# Patient Record
Sex: MALE | Race: ASIAN | NOT HISPANIC OR LATINO | URBAN - METROPOLITAN AREA
[De-identification: names, ages, dates, MRNs, and addresses within clinical notes are randomized per-mention and may not be internally consistent; named-entity substitution may affect disease eponyms.]

---

## 2018-07-01 ENCOUNTER — EMERGENCY (EMERGENCY)
Facility: HOSPITAL | Age: 23
LOS: 0 days | Discharge: HOME | End: 2018-07-02
Attending: EMERGENCY MEDICINE | Admitting: EMERGENCY MEDICINE
Payer: SELF-PAY

## 2018-07-01 VITALS
DIASTOLIC BLOOD PRESSURE: 78 MMHG | SYSTOLIC BLOOD PRESSURE: 114 MMHG | OXYGEN SATURATION: 100 % | TEMPERATURE: 99 F | HEIGHT: 71 IN | WEIGHT: 259.93 LBS | HEART RATE: 73 BPM

## 2018-07-01 DIAGNOSIS — S93.402A SPRAIN OF UNSPECIFIED LIGAMENT OF LEFT ANKLE, INITIAL ENCOUNTER: ICD-10-CM
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
## 2018-07-01 DIAGNOSIS — M25.572 PAIN IN LEFT ANKLE AND JOINTS OF LEFT FOOT: ICD-10-CM

## 2018-07-01 PROCEDURE — 99284 EMERGENCY DEPT VISIT MOD MDM: CPT | Mod: 25

## 2018-07-01 PROCEDURE — 99053 MED SERV 10PM-8AM 24 HR FAC: CPT

## 2018-07-01 RX ORDER — IBUPROFEN 200 MG
600 TABLET ORAL ONCE
Qty: 0 | Refills: 0 | Status: COMPLETED | OUTPATIENT
Start: 2018-07-01 | End: 2018-07-01

## 2018-07-01 NOTE — ED ADULT NURSE NOTE - NS ED NURSE DC INFO COMPLEXITY
Patient asked questions/Moderate: Comprehensive teaching/Verbalized Understanding/Returned Demonstration

## 2018-07-01 NOTE — ED ADULT NURSE NOTE - OBJECTIVE STATEMENT
22 year old male with no past medical history on no medications (daily cannabis smoker) presenting to the ED complaining of left ankle pain. Patient states that he felt his left ankle twist while he was walking last night, twisting laterally as her fell down. Patient states that he had increased pain/tenderness while ambulating on it tonight. Patient additionally complaining of a 4/10 aching to his left lateral malleolus. No obvious trauma/deformities noted.

## 2018-07-01 NOTE — ED ADULT NURSE NOTE - CHPI ED SYMPTOMS NEG
no fever/no tingling/no weakness/no numbness/no bruising/no abrasion/no stiffness/no deformity/no back pain

## 2018-07-01 NOTE — ED ADULT TRIAGE NOTE - BSA (M2)
[address] [place] [address]  Authorization for Invasive Procedures  1.  I hereby authorize Dr. Venu Toro** , my physician and whomever may be designated as the doctor's assistant, to perform the following operation and/or procedure:  *Tu performed for the purposes of advancing medicine, science, and/or education, provided my identity is not revealed. If the procedure has been videotaped, the physician/surgeon will obtain the original videotape.  The hospital will not be responsible for stor My signature below affirms that prior to the time of the procedure, I have explained to the patient and/or her legal representative, the risks and benefits involved in the proposed treatment and any reasonable alternative to the proposed treatment.  I have 2.36

## 2018-07-01 NOTE — ED ADULT TRIAGE NOTE - CHIEF COMPLAINT QUOTE
Pt presents to the ED with complaints of left ankle injury and pain s/p trip last night at approx 1AM. Pt able to wiggle toes, swelling noted to the ankle.

## 2018-07-02 VITALS
DIASTOLIC BLOOD PRESSURE: 80 MMHG | OXYGEN SATURATION: 100 % | SYSTOLIC BLOOD PRESSURE: 121 MMHG | HEART RATE: 70 BPM | RESPIRATION RATE: 17 BRPM

## 2018-07-02 PROCEDURE — 73610 X-RAY EXAM OF ANKLE: CPT | Mod: 26,LT

## 2018-07-02 RX ADMIN — Medication 600 MILLIGRAM(S): at 00:00

## 2018-07-02 NOTE — ED PROVIDER NOTE - OBJECTIVE STATEMENT
21 y/o male in ED c/o left ankle pain x 2 days s/p inversion ionjury.  pt states took motrin yesterday with some relief.   pt denies any other complaints.   increase pt with weight bearing

## 2018-07-02 NOTE — ED ADULT NURSE REASSESSMENT NOTE - NS ED NURSE REASSESS COMMENT FT1
0000: Patient awaiting X-rays, texting on his phone in no apparent distress, states pain well controlled at rest.   0100: Left ankle air cast applied by MD Atkinson. Patient able to ambulate well with cast. Patient education on follow up, signs and symptoms to return to the ER, and his care.

## 2019-09-12 ENCOUNTER — APPOINTMENT (OUTPATIENT)
Dept: PODIATRY | Facility: CLINIC | Age: 24
End: 2019-09-12

## 2019-09-12 ENCOUNTER — OUTPATIENT (OUTPATIENT)
Dept: OUTPATIENT SERVICES | Facility: HOSPITAL | Age: 24
LOS: 1 days | End: 2019-09-12
Payer: MEDICAID

## 2019-09-12 VITALS
WEIGHT: 233 LBS | BODY MASS INDEX: 29.9 KG/M2 | DIASTOLIC BLOOD PRESSURE: 76 MMHG | RESPIRATION RATE: 18 BRPM | OXYGEN SATURATION: 98 % | HEIGHT: 74 IN | HEART RATE: 76 BPM | TEMPERATURE: 97.9 F | SYSTOLIC BLOOD PRESSURE: 122 MMHG

## 2019-09-12 DIAGNOSIS — M20.41 OTHER HAMMER TOE(S) (ACQUIRED), RIGHT FOOT: ICD-10-CM

## 2019-09-12 DIAGNOSIS — F17.210 NICOTINE DEPENDENCE, CIGARETTES, UNCOMPLICATED: ICD-10-CM

## 2019-09-12 DIAGNOSIS — Z00.00 ENCOUNTER FOR GENERAL ADULT MEDICAL EXAMINATION WITHOUT ABNORMAL FINDINGS: ICD-10-CM

## 2019-09-12 DIAGNOSIS — F17.200 NICOTINE DEPENDENCE, UNSPECIFIED, UNCOMPLICATED: ICD-10-CM

## 2019-09-12 DIAGNOSIS — M20.42 OTHER HAMMER TOE(S) (ACQUIRED), RIGHT FOOT: ICD-10-CM

## 2019-09-12 PROCEDURE — G0463: CPT

## 2019-09-13 DIAGNOSIS — M20.41 OTHER HAMMER TOE(S) (ACQUIRED), RIGHT FOOT: ICD-10-CM

## 2019-09-17 NOTE — HISTORY OF PRESENT ILLNESS
[FreeTextEntry1] : CC: 24 yo male presents to clinic with a complaint of painful 4th digit hammertoe on the right foot.\par \par HPI:\par Patient states he started developing pain on the toe after spraining his ankle. Patient states he was referred here by Dr. Kwong. Patient states he has not taken any xrays for the foot. Patient states he has tried wearing wider shoes. Patient denies any f/n/v/sob.\par \par

## 2019-09-17 NOTE — ASSESSMENT
[FreeTextEntry1] : Vasc: DP/PT is 2/4 b/l; CFT<[id number]; Tg is WNL;\par Derm: no open lesions, no clinical signs of infection;\par Neuro: protective sensation is intact to the level of the digits b/l;\par MSK: rigid contracted digits of the 2nd, 3rd, and 4th digits on the right foot. muscle strength is 5/5 in all compartments of the foot b/l;\par \par A:\par Hammertoes\par \par P:\par Patient evaluated and chart reviewed\par Ordered xrays for the right foot\par Patient to RTC in 1 week for xray evaluation.\par Recommended patient to wear wider shoes.

## 2019-09-19 ENCOUNTER — APPOINTMENT (OUTPATIENT)
Dept: PODIATRY | Facility: CLINIC | Age: 24
End: 2019-09-19

## 2019-09-19 ENCOUNTER — OUTPATIENT (OUTPATIENT)
Dept: OUTPATIENT SERVICES | Facility: HOSPITAL | Age: 24
LOS: 1 days | End: 2019-09-19
Payer: MEDICAID

## 2019-09-19 DIAGNOSIS — M20.41 OTHER HAMMER TOE(S) (ACQUIRED), RIGHT FOOT: ICD-10-CM

## 2019-09-19 PROCEDURE — 73630 X-RAY EXAM OF FOOT: CPT | Mod: 26,RT

## 2019-09-19 PROCEDURE — 73630 X-RAY EXAM OF FOOT: CPT

## 2023-02-11 ENCOUNTER — EMERGENCY (EMERGENCY)
Facility: HOSPITAL | Age: 28
LOS: 1 days | Discharge: HOME | End: 2023-02-11
Admitting: EMERGENCY MEDICINE
Payer: MEDICAID

## 2023-02-11 VITALS
DIASTOLIC BLOOD PRESSURE: 82 MMHG | TEMPERATURE: 99 F | SYSTOLIC BLOOD PRESSURE: 126 MMHG | HEART RATE: 76 BPM | RESPIRATION RATE: 16 BRPM | OXYGEN SATURATION: 100 %

## 2023-02-11 PROCEDURE — 99284 EMERGENCY DEPT VISIT MOD MDM: CPT

## 2023-02-11 PROCEDURE — 73610 X-RAY EXAM OF ANKLE: CPT | Mod: 26,RT

## 2023-02-11 NOTE — ED PROVIDER NOTE - NSFOLLOWUPINSTRUCTIONS_ED_ALL_ED_FT
Keep aircast on for support.   Take motrin 600mg every 8 hours as needed for pain;   Avoid any strenuous activity; ice/elevate;   Follow up with an orthopedic within one week.  Return to ED for any worsening swelling, redness, fever or chills.

## 2023-02-11 NOTE — ED PROVIDER NOTE - PATIENT PORTAL LINK FT
You can access the FollowMyHealth Patient Portal offered by [facility] by registering at the following website: [email or web address]. By joining [employer]’s FollowMyHealth portal, you will also be able to view your health information using other applications (apps) compatible with our system.

## 2023-02-11 NOTE — ED PROVIDER NOTE - CLINICAL SUMMARY MEDICAL DECISION MAKING FREE TEXT BOX
27-year-old male history of psoriasis presents ED complaining of right ankle pain and swelling since this AM.  Patient states accidentally walked into a hole laundromat in his house causing him to twist his ankle and falling over.  States started to have pain and swelling that has been getting progressively worse throughout the day.  Denies any other injuries, head trauma.  Patient took ibuprofen around noon today.  On exam patient noted to have swelling and tenderness to palpation over the lateral malleolus.  Full ROM, no deformity.  Pulses intact.  R/oh fracture versus sprain.  We will get x-rays, patient already took ibuprofen prior to arrival.  Will set up with Ortho.

## 2023-02-11 NOTE — ED PROVIDER NOTE - OBJECTIVE STATEMENT
27-year-old male history of psoriasis presents ED complaining of right ankle pain and swelling since this AM.  Patient states accidentally walked into a hole laundromat in his house causing him to twist his ankle and falling over.  States started to have pain and swelling that has been getting progressively worse throughout the day.  Denies any other injuries, head trauma.  Patient took ibuprofen around noon today.

## 2023-08-31 ENCOUNTER — EMERGENCY (EMERGENCY)
Facility: HOSPITAL | Age: 28
LOS: 1 days | Discharge: HOME | End: 2023-08-31
Admitting: EMERGENCY MEDICINE
Payer: SELF-PAY

## 2023-08-31 VITALS
RESPIRATION RATE: 18 BRPM | SYSTOLIC BLOOD PRESSURE: 127 MMHG | OXYGEN SATURATION: 98 % | HEART RATE: 90 BPM | TEMPERATURE: 99 F | DIASTOLIC BLOOD PRESSURE: 85 MMHG

## 2023-08-31 LAB
ALBUMIN SERPL ELPH-MCNC: 4.1 G/DL — SIGNIFICANT CHANGE UP (ref 3.3–5)
ALP SERPL-CCNC: 90 U/L — SIGNIFICANT CHANGE UP (ref 40–120)
ALT FLD-CCNC: 52 U/L — HIGH (ref 4–41)
ANION GAP SERPL CALC-SCNC: 12 MMOL/L — SIGNIFICANT CHANGE UP (ref 7–14)
ANISOCYTOSIS BLD QL: SLIGHT — SIGNIFICANT CHANGE UP
APPEARANCE UR: CLEAR — SIGNIFICANT CHANGE UP
AST SERPL-CCNC: 28 U/L — SIGNIFICANT CHANGE UP (ref 4–40)
BACTERIA # UR AUTO: ABNORMAL /HPF
BASOPHILS # BLD AUTO: 0.12 K/UL — SIGNIFICANT CHANGE UP (ref 0–0.2)
BASOPHILS NFR BLD AUTO: 0.9 % — SIGNIFICANT CHANGE UP (ref 0–2)
BILIRUB SERPL-MCNC: 0.4 MG/DL — SIGNIFICANT CHANGE UP (ref 0.2–1.2)
BILIRUB UR-MCNC: NEGATIVE — SIGNIFICANT CHANGE UP
BUN SERPL-MCNC: 14 MG/DL — SIGNIFICANT CHANGE UP (ref 7–23)
CALCIUM SERPL-MCNC: 9.1 MG/DL — SIGNIFICANT CHANGE UP (ref 8.4–10.5)
CAST: 1 /LPF — SIGNIFICANT CHANGE UP (ref 0–4)
CHLORIDE SERPL-SCNC: 102 MMOL/L — SIGNIFICANT CHANGE UP (ref 98–107)
CO2 SERPL-SCNC: 24 MMOL/L — SIGNIFICANT CHANGE UP (ref 22–31)
COLOR SPEC: YELLOW — SIGNIFICANT CHANGE UP
CREAT SERPL-MCNC: 0.86 MG/DL — SIGNIFICANT CHANGE UP (ref 0.5–1.3)
DIFF PNL FLD: ABNORMAL
EGFR: 121 ML/MIN/1.73M2 — SIGNIFICANT CHANGE UP
EOSINOPHIL # BLD AUTO: 0.35 K/UL — SIGNIFICANT CHANGE UP (ref 0–0.5)
EOSINOPHIL NFR BLD AUTO: 2.6 % — SIGNIFICANT CHANGE UP (ref 0–6)
GIANT PLATELETS BLD QL SMEAR: PRESENT — SIGNIFICANT CHANGE UP
GLUCOSE SERPL-MCNC: 84 MG/DL — SIGNIFICANT CHANGE UP (ref 70–99)
GLUCOSE UR QL: NEGATIVE MG/DL — SIGNIFICANT CHANGE UP
HCT VFR BLD CALC: 49.4 % — SIGNIFICANT CHANGE UP (ref 39–50)
HGB BLD-MCNC: 16.1 G/DL — SIGNIFICANT CHANGE UP (ref 13–17)
IANC: 7.42 K/UL — HIGH (ref 1.8–7.4)
KETONES UR-MCNC: NEGATIVE MG/DL — SIGNIFICANT CHANGE UP
LEUKOCYTE ESTERASE UR-ACNC: NEGATIVE — SIGNIFICANT CHANGE UP
LYMPHOCYTES # BLD AUTO: 27.8 % — SIGNIFICANT CHANGE UP (ref 13–44)
LYMPHOCYTES # BLD AUTO: 3.77 K/UL — HIGH (ref 1–3.3)
MCHC RBC-ENTMCNC: 26 PG — LOW (ref 27–34)
MCHC RBC-ENTMCNC: 32.6 GM/DL — SIGNIFICANT CHANGE UP (ref 32–36)
MCV RBC AUTO: 79.8 FL — LOW (ref 80–100)
MICROCYTES BLD QL: SLIGHT — SIGNIFICANT CHANGE UP
MONOCYTES # BLD AUTO: 1.06 K/UL — HIGH (ref 0–0.9)
MONOCYTES NFR BLD AUTO: 7.8 % — SIGNIFICANT CHANGE UP (ref 2–14)
NEUTROPHILS # BLD AUTO: 7.78 K/UL — HIGH (ref 1.8–7.4)
NEUTROPHILS NFR BLD AUTO: 57.4 % — SIGNIFICANT CHANGE UP (ref 43–77)
NITRITE UR-MCNC: NEGATIVE — SIGNIFICANT CHANGE UP
PH UR: 6 — SIGNIFICANT CHANGE UP (ref 5–8)
PLAT MORPH BLD: NORMAL — SIGNIFICANT CHANGE UP
PLATELET # BLD AUTO: 317 K/UL — SIGNIFICANT CHANGE UP (ref 150–400)
PLATELET COUNT - ESTIMATE: NORMAL — SIGNIFICANT CHANGE UP
POTASSIUM SERPL-MCNC: 4.1 MMOL/L — SIGNIFICANT CHANGE UP (ref 3.5–5.3)
POTASSIUM SERPL-SCNC: 4.1 MMOL/L — SIGNIFICANT CHANGE UP (ref 3.5–5.3)
PROT SERPL-MCNC: 8.9 G/DL — HIGH (ref 6–8.3)
PROT UR-MCNC: 100 MG/DL
RBC # BLD: 6.19 M/UL — HIGH (ref 4.2–5.8)
RBC # FLD: 14.7 % — HIGH (ref 10.3–14.5)
RBC BLD AUTO: ABNORMAL
RBC CASTS # UR COMP ASSIST: 5 /HPF — HIGH (ref 0–4)
REVIEW: SIGNIFICANT CHANGE UP
SODIUM SERPL-SCNC: 138 MMOL/L — SIGNIFICANT CHANGE UP (ref 135–145)
SP GR SPEC: 1.02 — SIGNIFICANT CHANGE UP (ref 1–1.03)
SQUAMOUS # UR AUTO: 2 /HPF — SIGNIFICANT CHANGE UP (ref 0–5)
UROBILINOGEN FLD QL: 0.2 MG/DL — SIGNIFICANT CHANGE UP (ref 0.2–1)
VARIANT LYMPHS # BLD: 3.5 % — SIGNIFICANT CHANGE UP (ref 0–6)
WBC # BLD: 13.55 K/UL — HIGH (ref 3.8–10.5)
WBC # FLD AUTO: 13.55 K/UL — HIGH (ref 3.8–10.5)
WBC UR QL: 12 /HPF — HIGH (ref 0–5)

## 2023-08-31 PROCEDURE — 99284 EMERGENCY DEPT VISIT MOD MDM: CPT

## 2023-08-31 RX ORDER — SODIUM CHLORIDE 9 MG/ML
1000 INJECTION INTRAMUSCULAR; INTRAVENOUS; SUBCUTANEOUS ONCE
Refills: 0 | Status: COMPLETED | OUTPATIENT
Start: 2023-08-31 | End: 2023-08-31

## 2023-08-31 RX ORDER — KETOROLAC TROMETHAMINE 30 MG/ML
30 SYRINGE (ML) INJECTION ONCE
Refills: 0 | Status: DISCONTINUED | OUTPATIENT
Start: 2023-08-31 | End: 2023-08-31

## 2023-08-31 RX ADMIN — SODIUM CHLORIDE 1000 MILLILITER(S): 9 INJECTION INTRAMUSCULAR; INTRAVENOUS; SUBCUTANEOUS at 21:39

## 2023-08-31 RX ADMIN — Medication 30 MILLIGRAM(S): at 21:39

## 2023-08-31 NOTE — ED PROVIDER NOTE - OBJECTIVE STATEMENT
26 y/o male no PMH presents to ER c/o  right flank pain x 3 days. Pt. satets for the apst 3-4 days has been experiencing right sided flank pain that raidates into this rlq groin area. states pain is intermittent in nature and currently rates pain 8/10. states last week noted small amount of hematuria that resolved. denies nausea vomit weakness dizziness loc.   Denies hx or family hx kidney stones

## 2023-08-31 NOTE — ED ADULT TRIAGE NOTE - CHIEF COMPLAINT QUOTE
c/o right flank pain that rad ites down the RLQ abd, onset 3 days ago, pt also endorses urinary frequency. denies phx

## 2023-08-31 NOTE — ED PROVIDER NOTE - CLINICAL SUMMARY MEDICAL DECISION MAKING FREE TEXT BOX
26 y/o male c/o right flank pain x 3 days   -concern for renal colic/stone, r/o pyelo vs stone  -labs ua ucx  -ct ap  -iv fluids toradol

## 2023-08-31 NOTE — ED PROVIDER NOTE - PATIENT PORTAL LINK FT
You can access the FollowMyHealth Patient Portal offered by [facility] by registering at the following website: [email or web address]. By joining [employer]’s FollowMyHealth portal, you will also be able to view your health information using other applications (apps) compatible with our system.

## 2023-08-31 NOTE — ED PROVIDER NOTE - PROGRESS NOTE DETAILS
LION Bermudez Note----This patient was signed out to me by LION Emanuel; lab/UA results reviewed; pt was pending CT abd/pelvis at time of sign-out.  In the interim, pt objectively noted to be resting comfortably; pt has been clinically stable; no issues thus far.  CT abd/pelvis was performed: "...IMPRESSION: No evidence for obstructive uropathy. Trace right periureteral stranding to indicate pyelitis. Please correlate with urinalysis.".  UA: negative leuk/nit, moderate bacteria, 12 WBC, 2 epithelial cells, small blood, 100 protein, 5 RBC.  UCX was sent and is currently testing.  Clinical impression is that of possible passed ureteral stone.  UCX is testing; will have pt follow up on results in addition to culture results being followed as part of ED Admin Team follow up process.  Pt will be discharged per below instructions.

## 2023-08-31 NOTE — ED PROVIDER NOTE - NSFOLLOWUPINSTRUCTIONS_ED_ALL_ED_FT
Follow up with your primary care doctor within 2-3 days of ER discharge.  **Bring your discharge papers / test results with you to your follow up appointment.    If you ever need assistance finding a doctor to follow up with, you may call the [facility] Patient Access Services helpline at [phone] to find names/contact #s for a provider/specialist to follow up with.    Rest / no strenuous activity.  Stay well hydrated; drink plenty of water.    A copy of your test results is being provided to you.    All results including incidental findings were reviewed at discharge.    Should you have any questions that arise after you are discharged, please feel free to contact the ER ([phone]) to have your questions answered.    MEDICATIONS:    For pain, you may take ibuprofen (Motrin or Advil) or acetaminophen (Tylenol) as needed:   --ibuprofen (Motrin or Advil):  over-the-counter 200 milligram tablets:       Take three tablets (600 milligram dose) every 6 hours as needed for pain/inflammation; take WITH FOOD.   --acetaminophen (Tylenol):  over-the-counter; follow package instructions carefully regarding      dosage / usage.  Be careful not to exceed the total daily maximum dosage.      ***Return to the Emergency Department IMMEDIATELY if you experience any new / worsening symptoms or have any problems / concerns.***

## 2023-08-31 NOTE — ED ADULT NURSE NOTE - OBJECTIVE STATEMENT
5
pt received in wellness room 1. pt AAOX4 and ambulatory. pt c/o right flank pain for one week. pt c/o pain to RLQ and radiates around to back. pt denies dysuria. pt had one episode of hematuria. pt denies chest pain, SOB, n/v/d, fever and chills. pt RR are even and unlabored. pt has 20g in RAC, labs drawn and sent and pt medicated as ordered. Onoging eval in progress.

## 2023-08-31 NOTE — ED PROVIDER NOTE - PHYSICAL EXAMINATION
Gen: Well appearing in NAD  Head: NC/AT  Neck: trachea midline  Resp:  No distress  Ext: no deformities  Neuro:  A&O appears non focal  Skin:  Warm and dry as visualized  Psych:  Normal affect and mood    NO CVAT BL

## 2023-09-01 VITALS
OXYGEN SATURATION: 100 % | HEART RATE: 63 BPM | SYSTOLIC BLOOD PRESSURE: 120 MMHG | RESPIRATION RATE: 18 BRPM | TEMPERATURE: 97 F | DIASTOLIC BLOOD PRESSURE: 63 MMHG

## 2023-09-01 PROBLEM — L40.9 PSORIASIS, UNSPECIFIED: Chronic | Status: ACTIVE | Noted: 2023-02-11

## 2023-09-01 PROCEDURE — G1004: CPT

## 2023-09-01 PROCEDURE — 74176 CT ABD & PELVIS W/O CONTRAST: CPT | Mod: 26,MG

## 2023-09-01 RX ORDER — IBUPROFEN 200 MG
1 TABLET ORAL
Qty: 30 | Refills: 0
Start: 2023-09-01

## 2023-09-04 RX ORDER — CEFDINIR 250 MG/5ML
1 POWDER, FOR SUSPENSION ORAL
Qty: 10 | Refills: 0
Start: 2023-09-04 | End: 2023-09-08

## 2023-09-04 NOTE — ED POST DISCHARGE NOTE - RESULT SUMMARY
UCX : E. Coli > 100,000 No antibiotic listed in ED provider note or prescription writer at time of discharge. Patient contacted denies any fever, chills or dysuria. Discussed with patient can ERX Abx if patient wants. Patient does want ABX. Discussed with patient to follow up with MD for repeat UA/UCX. Discussed with patient need to return to ED if symptoms don't continue to improve or recur or develops any new or worsening symptoms that are of concern. ERXD Cefdinir 300mg BID X 5 days.